# Patient Record
Sex: MALE | Employment: OTHER | URBAN - METROPOLITAN AREA
[De-identification: names, ages, dates, MRNs, and addresses within clinical notes are randomized per-mention and may not be internally consistent; named-entity substitution may affect disease eponyms.]

---

## 2022-06-04 ENCOUNTER — TELEPHONE ENCOUNTER (OUTPATIENT)
Dept: URBAN - METROPOLITAN AREA CLINIC 68 | Facility: CLINIC | Age: 82
End: 2022-06-04

## 2022-06-04 RX ORDER — SERTRALINE HYDROCHLORIDE 112 UG/1
LEVOTHYROXINE SODIUM( 112MCG ORAL  DAILY ) INACTIVE -HX ENTRY TABLET ORAL DAILY
OUTPATIENT
Start: 2012-04-16

## 2022-06-04 RX ORDER — TAMSULOSIN HYDROCHLORIDE 0.4 MG/1
TAMSULOSIN HCL( 0.4MG ORAL  DAILY ) INACTIVE -HX ENTRY CAPSULE ORAL DAILY
OUTPATIENT
Start: 2015-01-29

## 2022-06-04 RX ORDER — ISOSORBIDE DINITRATE 5 MG/1
ISOSORBIDE DINITRATE( 5MG ORAL  DAILY ) INACTIVE -HX ENTRY TABLET ORAL DAILY
OUTPATIENT
Start: 2015-02-25

## 2022-06-04 RX ORDER — ESOMEPRAZOLE MAGNESIUM 40 MG
NEXIUM( 40MG ORAL 1 TWO TIMES DAILY ) DISCONTINUED -HX ENTRY CAPSULE,DELAYED RELEASE (ENTERIC COATED) ORAL
OUTPATIENT
Start: 2016-10-12 | End: 2016-10-12

## 2022-06-04 RX ORDER — ESOMEPRAZOLE MAGNESIUM 20 MG/1
NEXIUM( 20MG ORAL   ) INACTIVE -HX ENTRY GRANULE, DELAYED RELEASE ORAL
OUTPATIENT
Start: 2012-04-16

## 2022-06-04 RX ORDER — AMLODIPINE BESYLATE AND OLMESARTAN MEDOXOMIL 10; 20 MG/1; MG/1
AZOR( 10-20MG ORAL 1 DAILY ) INACTIVE -HX ENTRY TABLET, FILM COATED ORAL DAILY
OUTPATIENT
Start: 2015-01-29

## 2022-06-04 RX ORDER — POLYETHYLENE GLYCOL 3350, SODIUM SULFATE, SODIUM CHLORIDE, POTASSIUM CHLORIDE, ASCORBIC ACID, SODIUM ASCORBATE 7.5-2.691G
KIT ORAL AS DIRECTED
Qty: 1 | Refills: 0 | OUTPATIENT
Start: 2012-02-20 | End: 2012-04-16

## 2022-06-05 ENCOUNTER — TELEPHONE ENCOUNTER (OUTPATIENT)
Dept: URBAN - METROPOLITAN AREA CLINIC 68 | Facility: CLINIC | Age: 82
End: 2022-06-05

## 2022-06-05 RX ORDER — METOPROLOL TARTRATE 50 MG/1
METOPROLOL TARTRATE( 50MG ORAL  DAILY ) ACTIVE -HX ENTRY TABLET, FILM COATED ORAL DAILY
Status: ACTIVE | COMMUNITY
Start: 2019-09-05

## 2022-06-05 RX ORDER — TICAGRELOR 60 MG/1
BRILINTA( 60MG ORAL  DAILY ) ACTIVE -HX ENTRY TABLET ORAL DAILY
Status: ACTIVE | COMMUNITY
Start: 2019-09-05

## 2022-06-05 RX ORDER — PANTOPRAZOLE SODIUM 40 MG/1
TABLET, DELAYED RELEASE ORAL DAILY
Qty: 30 | Refills: 30 | Status: ACTIVE | COMMUNITY
Start: 2016-10-12

## 2022-06-05 RX ORDER — LORAZEPAM 1 MG/1
LORAZEPAM( 1MG ORAL  AS DIRECTED ) ACTIVE -HX ENTRY TABLET ORAL AS DIRECTED
Status: ACTIVE | COMMUNITY
Start: 2019-09-05

## 2022-06-05 RX ORDER — METFORMIN HYDROCHLORIDE 500 MG/1
METFORMIN HCL( 500MG ORAL  DAILY ) ACTIVE -HX ENTRY TABLET, COATED ORAL DAILY
Status: ACTIVE | COMMUNITY
Start: 2019-09-05

## 2022-06-05 RX ORDER — SOLIFENACIN SUCCINATE 5 MG/1
VESICARE( 5MG ORAL  DAILY ) ACTIVE -HX ENTRY TABLET, FILM COATED ORAL DAILY
Status: ACTIVE | COMMUNITY
Start: 2019-09-05

## 2022-06-05 RX ORDER — METHYLCELLULOSE 500 MG/1
TABLET ORAL
Qty: 60 | Refills: 60 | Status: ACTIVE | COMMUNITY
Start: 2019-09-05

## 2022-06-05 RX ORDER — SILODOSIN 8 MG/1
RAPAFLO( 8MG ORAL  DAILY ) ACTIVE -HX ENTRY CAPSULE ORAL DAILY
Status: ACTIVE | COMMUNITY
Start: 2019-09-05

## 2022-06-05 RX ORDER — SIMVASTATIN 20 MG/1
SIMVASTATIN( 20MG ORAL  DAILY ) ACTIVE -HX ENTRY TABLET, FILM COATED ORAL DAILY
Status: ACTIVE | COMMUNITY
Start: 2019-09-05

## 2022-06-05 RX ORDER — LEVOTHYROXINE SODIUM 0.09 MG/1
LEVOTHYROXINE SODIUM( 88MCG ORAL  DAILY ) ACTIVE -HX ENTRY TABLET ORAL DAILY
Status: ACTIVE | COMMUNITY
Start: 2019-09-05

## 2022-06-05 RX ORDER — ASPIRIN 81 MG/1
ASPIRIN( 81MG ORAL  DAILY ) ACTIVE -HX ENTRY TABLET, COATED ORAL DAILY
Status: ACTIVE | COMMUNITY
Start: 2019-09-05

## 2022-06-25 ENCOUNTER — TELEPHONE ENCOUNTER (OUTPATIENT)
Age: 82
End: 2022-06-25

## 2022-06-25 RX ORDER — AMLODIPINE BESYLATE AND OLMESARTAN MEDOXOMIL 10; 20 MG/1; MG/1
AZOR( 10-20MG ORAL 1 DAILY ) INACTIVE -HX ENTRY TABLET, FILM COATED ORAL DAILY
OUTPATIENT
Start: 2015-01-29

## 2022-06-25 RX ORDER — FAMOTIDINE 10 MG
ZANTAC 75( 75MG ORAL 1 TWO TIMES DAILY ) INACTIVE -HX ENTRY TABLET ORAL
OUTPATIENT
Start: 2015-01-29

## 2022-06-25 RX ORDER — ISOSORBIDE DINITRATE 5 MG/1
ISOSORBIDE DINITRATE( 5MG ORAL  DAILY ) INACTIVE -HX ENTRY TABLET ORAL DAILY
OUTPATIENT
Start: 2015-02-25

## 2022-06-25 RX ORDER — POLYETHYLENE GLYCOL 3350, SODIUM SULFATE, SODIUM CHLORIDE, POTASSIUM CHLORIDE, ASCORBIC ACID, SODIUM ASCORBATE 7.5-2.691G
KIT ORAL AS DIRECTED
Qty: 1 | Refills: 0 | OUTPATIENT
Start: 2012-02-20 | End: 2012-04-16

## 2022-06-25 RX ORDER — TAMSULOSIN HYDROCHLORIDE 0.4 MG/1
TAMSULOSIN HCL( 0.4MG ORAL  DAILY ) INACTIVE -HX ENTRY CAPSULE ORAL DAILY
OUTPATIENT
Start: 2015-01-29

## 2022-06-25 RX ORDER — SERTRALINE HYDROCHLORIDE 112 UG/1
LEVOTHYROXINE SODIUM( 112MCG ORAL  DAILY ) INACTIVE -HX ENTRY TABLET ORAL DAILY
OUTPATIENT
Start: 2012-04-16

## 2022-06-25 RX ORDER — ESOMEPRAZOLE MAGNESIUM 20 MG/1
NEXIUM( 20MG ORAL   ) INACTIVE -HX ENTRY GRANULE, DELAYED RELEASE ORAL
OUTPATIENT
Start: 2012-04-16

## 2022-06-25 RX ORDER — ESOMEPRAZOLE MAGNESIUM 40 MG
NEXIUM( 40MG ORAL 1 TWO TIMES DAILY ) DISCONTINUED -HX ENTRY CAPSULE,DELAYED RELEASE (ENTERIC COATED) ORAL
OUTPATIENT
Start: 2016-10-12 | End: 2016-10-12

## 2022-06-26 ENCOUNTER — TELEPHONE ENCOUNTER (OUTPATIENT)
Age: 82
End: 2022-06-26

## 2022-06-26 RX ORDER — SIMVASTATIN 20 MG/1
SIMVASTATIN( 20MG ORAL  DAILY ) ACTIVE -HX ENTRY TABLET, FILM COATED ORAL DAILY
Status: ACTIVE | COMMUNITY
Start: 2019-09-05

## 2022-06-26 RX ORDER — METOPROLOL TARTRATE 50 MG/1
METOPROLOL TARTRATE( 50MG ORAL  DAILY ) ACTIVE -HX ENTRY TABLET, FILM COATED ORAL DAILY
Status: ACTIVE | COMMUNITY
Start: 2019-09-05

## 2022-06-26 RX ORDER — LEVOTHYROXINE SODIUM 0.09 MG/1
LEVOTHYROXINE SODIUM( 88MCG ORAL  DAILY ) ACTIVE -HX ENTRY TABLET ORAL DAILY
Status: ACTIVE | COMMUNITY
Start: 2019-09-05

## 2022-06-26 RX ORDER — TICAGRELOR 60 MG/1
BRILINTA( 60MG ORAL  DAILY ) ACTIVE -HX ENTRY TABLET ORAL DAILY
Status: ACTIVE | COMMUNITY
Start: 2019-09-05

## 2022-06-26 RX ORDER — SOLIFENACIN SUCCINATE 5 MG/1
VESICARE( 5MG ORAL  DAILY ) ACTIVE -HX ENTRY TABLET, FILM COATED ORAL DAILY
Status: ACTIVE | COMMUNITY
Start: 2019-09-05

## 2022-06-26 RX ORDER — LORAZEPAM 1 MG/1
LORAZEPAM( 1MG ORAL  AS DIRECTED ) ACTIVE -HX ENTRY TABLET ORAL AS DIRECTED
Status: ACTIVE | COMMUNITY
Start: 2019-09-05

## 2022-06-26 RX ORDER — PANTOPRAZOLE 40 MG/1
TABLET, DELAYED RELEASE ORAL DAILY
Qty: 30 | Refills: 30 | Status: ACTIVE | COMMUNITY
Start: 2016-10-12

## 2022-06-26 RX ORDER — SILODOSIN 8 MG/1
RAPAFLO( 8MG ORAL  DAILY ) ACTIVE -HX ENTRY CAPSULE ORAL DAILY
Status: ACTIVE | COMMUNITY
Start: 2019-09-05

## 2022-06-26 RX ORDER — ASPIRIN 81 MG/1
ASPIRIN( 81MG ORAL  DAILY ) ACTIVE -HX ENTRY TABLET, COATED ORAL DAILY
Status: ACTIVE | COMMUNITY
Start: 2019-09-05

## 2022-06-26 RX ORDER — METHYLCELLULOSE 500 MG/1
TABLET ORAL
Qty: 60 | Refills: 60 | Status: ACTIVE | COMMUNITY
Start: 2019-09-05

## 2022-06-26 RX ORDER — METFORMIN HCL 500 MG/1
METFORMIN HCL( 500MG ORAL  DAILY ) ACTIVE -HX ENTRY TABLET ORAL DAILY
Status: ACTIVE | COMMUNITY
Start: 2019-09-05

## 2022-06-30 ENCOUNTER — OFFICE VISIT (OUTPATIENT)
Dept: PRIMARY CARE CLINIC | Age: 82
End: 2022-06-30

## 2022-06-30 ENCOUNTER — HOSPITAL ENCOUNTER (OUTPATIENT)
Age: 82
Setting detail: SPECIMEN
Discharge: HOME | End: 2022-06-30

## 2022-06-30 VITALS
SYSTOLIC BLOOD PRESSURE: 130 MMHG | WEIGHT: 150 LBS | HEIGHT: 64 IN | DIASTOLIC BLOOD PRESSURE: 72 MMHG | BODY MASS INDEX: 25.61 KG/M2 | HEART RATE: 85 BPM | TEMPERATURE: 97.6 F | OXYGEN SATURATION: 96 %

## 2022-06-30 DIAGNOSIS — R39.9 UTI SYMPTOMS: Primary | ICD-10-CM

## 2022-06-30 LAB
BILIRUBIN, POC: NORMAL
BLOOD URINE, POC: NORMAL
CLARITY, POC: CLEAR
COLOR, POC: YELLOW
GLUCOSE URINE, POC: NORMAL
KETONES, POC: NORMAL
LEUKOCYTE EST, POC: NORMAL
NITRITE, POC: NORMAL
PH, POC: 5.5
PROTEIN, POC: NORMAL
SPECIFIC GRAVITY, POC: 1.01
UROBILINOGEN, POC: 0.2

## 2022-06-30 PROCEDURE — 1123F ACP DISCUSS/DSCN MKR DOCD: CPT | Performed by: NURSE PRACTITIONER

## 2022-06-30 PROCEDURE — 99203 OFFICE O/P NEW LOW 30 MIN: CPT | Performed by: NURSE PRACTITIONER

## 2022-06-30 PROCEDURE — 81002 URINALYSIS NONAUTO W/O SCOPE: CPT | Performed by: NURSE PRACTITIONER

## 2022-06-30 RX ORDER — NITROGLYCERIN 0.4 MG/1
TABLET SUBLINGUAL
COMMUNITY

## 2022-06-30 RX ORDER — MEMANTINE HYDROCHLORIDE 5 MG/1
TABLET ORAL
COMMUNITY
Start: 2022-05-18

## 2022-06-30 RX ORDER — ICOSAPENT ETHYL 1000 MG/1
2 CAPSULE ORAL 2 TIMES DAILY WITH MEALS
COMMUNITY

## 2022-06-30 RX ORDER — ISOSORBIDE DINITRATE 30 MG/1
TABLET ORAL
COMMUNITY

## 2022-06-30 RX ORDER — SULFAMETHOXAZOLE AND TRIMETHOPRIM 800; 160 MG/1; MG/1
1 TABLET ORAL 2 TIMES DAILY
Qty: 14 TABLET | Refills: 0 | Status: SHIPPED | OUTPATIENT
Start: 2022-06-30 | End: 2022-07-07

## 2022-06-30 RX ORDER — LISINOPRIL 40 MG/1
TABLET ORAL
COMMUNITY
Start: 2022-04-19

## 2022-06-30 RX ORDER — LEVOTHYROXINE SODIUM 112 UG/1
TABLET ORAL
COMMUNITY
Start: 2022-05-18

## 2022-06-30 RX ORDER — CITALOPRAM 40 MG/1
200 TABLET ORAL DAILY
COMMUNITY

## 2022-06-30 RX ORDER — PREGABALIN 100 MG/1
CAPSULE ORAL
COMMUNITY
Start: 2022-03-30

## 2022-06-30 RX ORDER — TRAZODONE HYDROCHLORIDE 50 MG/1
50 TABLET ORAL NIGHTLY
COMMUNITY

## 2022-06-30 RX ORDER — SOLIFENACIN SUCCINATE 5 MG/1
TABLET, FILM COATED ORAL
COMMUNITY

## 2022-06-30 RX ORDER — DULOXETIN HYDROCHLORIDE 30 MG/1
CAPSULE, DELAYED RELEASE ORAL
COMMUNITY
Start: 2022-06-22

## 2022-06-30 ASSESSMENT — PATIENT HEALTH QUESTIONNAIRE - PHQ9
2. FEELING DOWN, DEPRESSED OR HOPELESS: 1
SUM OF ALL RESPONSES TO PHQ QUESTIONS 1-9: 2
SUM OF ALL RESPONSES TO PHQ QUESTIONS 1-9: 2
1. LITTLE INTEREST OR PLEASURE IN DOING THINGS: 1
SUM OF ALL RESPONSES TO PHQ9 QUESTIONS 1 & 2: 2
SUM OF ALL RESPONSES TO PHQ QUESTIONS 1-9: 2
SUM OF ALL RESPONSES TO PHQ QUESTIONS 1-9: 2

## 2022-06-30 ASSESSMENT — ENCOUNTER SYMPTOMS
DIARRHEA: 1
RESPIRATORY NEGATIVE: 1
NAUSEA: 0
SHORTNESS OF BREATH: 0
ABDOMINAL PAIN: 0
CHEST TIGHTNESS: 0
WHEEZING: 0
COUGH: 0
VOMITING: 0
CONSTIPATION: 1

## 2022-06-30 NOTE — PATIENT INSTRUCTIONS
Patient Education        Urinary Tract Infections (UTI) in Men: Care Instructions  Overview     A urinary tract infection, or UTI, is a term for an infection anywhere between the kidneys and the urethra. (The urethra is the tube that carries urine from the bladder to outside the body.) Most UTIs are bladder infections. They oftencause pain or burning when you urinate. UTIs are caused by bacteria. This means they can be cured with antibiotics. Besure to complete your treatment so that the infection does not get worse. Follow-up care is a key part of your treatment and safety. Be sure to make and go to all appointments, and call your doctor if you are having problems. It's also a good idea to know your test results and keep alist of the medicines you take. How can you care for yourself at home?  Take your antibiotics as prescribed. Do not stop taking them just because you feel better. You need to take the full course of antibiotics.  Take your medicines exactly as prescribed. Your doctor may have prescribed a medicine, such as phenazopyridine (Pyridium), to help relieve pain when you urinate. This turns your urine orange. You may stop taking it when your symptoms get better. But be sure to take all of your antibiotics, which treat the infection.  Drink extra water for the next day or two. This will help make the urine less concentrated and help wash out the bacteria causing the infection. (If you have kidney, heart, or liver disease and have to limit your fluids, talk with your doctor before you increase your fluid intake.)   Avoid drinks that are carbonated or have caffeine. They can irritate the bladder.  Urinate often. Try to empty your bladder each time.  To relieve pain, take a hot bath or lay a heating pad (set on low) over your lower belly or genital area. Never go to sleep with a heating pad in place. To help prevent UTIs   Drink plenty of fluids.  If you have kidney, heart, or liver disease and have to limit fluids, talk with your doctor before you increase the amount of fluids you drink.  Urinate when you have the urge. Do not hold your urine for a long time. Urinate before you go to sleep.  Keep your penis clean. Catheter care  If you have a drainage tube (catheter) in place, the following steps will helpyou care for it.  Always wash your hands before and after touching your catheter.  Check the area around the urethra for inflammation or signs of infection. Signs of infection include irritated, swollen, red, or tender skin, or pus around the catheter.  Clean the area around the catheter with soap and water two times a day. Dry with a clean towel afterward.  Do not apply powder or lotion to the skin around the catheter. To empty the urine collection bag   [facility] hospital your hands with soap and water.  Without touching the drain spout, remove the spout from its sleeve at the bottom of the collection bag. Open the valve on the spout.  Let the urine flow out of the bag and into the toilet or a container. Do not let the tubing or drain spout touch anything.  After you empty the bag, clean the end of the drain spout with tissue and water. Close the valve and put the drain spout back into its sleeve at the bottom of the collection bag.  Wash your hands with soap and water. When should you call for help? Call your doctor now or seek immediate medical care if:     Symptoms such as a fever, chills, nausea, or vomiting get worse or happen for the first time.      You have new pain in your back just below your rib cage. This is called flank pain.      There is new blood or pus in your urine.      You are not able to take or keep down your antibiotics. Watch closely for changes in your health, and be sure to contact your doctor if:     You are not getting better after taking an antibiotic for 2 days.      Your symptoms go away but then come back. Where can you learn more?   Go to [email or web address] and sign in to your [employer] account. Enter D711 in the [facility] box to learn more about \"Urinary Tract Infections (UTI) in Men: Care Instructions. \"     If you do not have an account, please click on the \"Sign Up Now\" link. Current as of: October 18, 2021               Content Version: 13.3  © 4958-7999 [employer]. Care instructions adapted under license by [facility] ([facility]). If you have questions about a medical condition or this instruction, always ask your healthcare professional. [address] any warranty or liability for your use of this information.

## 2022-06-30 NOTE — PROGRESS NOTES
[address] WALK IN CARE  [address]  [place] [address]  Dept: [phone]  Dept Fax: [phone]     Amie Irene is a 80 y.o. male who presents to the urgent care today for his medicalconditions/complaints as noted below. Amie Irene is c/o of Urinary Tract Infection (frequent urination x 2 weeks )    HPI:      Urinary Tract Infection   This is a new problem. Episode onset: 2 weeks ago. The problem has been unchanged. The patient is experiencing no pain. There is no history of pyelonephritis. Associated symptoms include frequency and urgency. Pertinent negatives include no chills, flank pain, hematuria, hesitancy, nausea or vomiting. He has tried nothing for the symptoms. The treatment provided no relief. Past Medical History:   Diagnosis Date    Diabetes mellitus ([address]     Hyperlipidemia     Hypertension     Thyroid disease       Current Outpatient Medications   Medication Sig Dispense Refill    sulfamethoxazole-trimethoprim (BACTRIM DS;SEPTRA DS) 800-160 MG per tablet Take 1 tablet by mouth 2 times daily for 7 days 14 tablet 0    citalopram (CELEXA) 40 MG tablet Take 200 mg by mouth daily      DULoxetine (CYMBALTA) 30 MG extended release capsule TAKE 1 CAPSULE BY MOUTH DAILY      Icosapent Ethyl (VASCEPA) 1 g CAPS capsule Take 2 g by mouth 2 times daily (with meals)      isosorbide dinitrate (ISORDIL) 30 MG tablet isosorbide dinitrate 30 mg tablet   Take 1 tablet every day by oral route.  levothyroxine (SYNTHROID) 112 MCG tablet TAKE 1 TABLET BY MOUTH DAILY      lisinopril (PRINIVIL;ZESTRIL) 40 MG tablet       memantine (NAMENDA) 5 MG tablet TAKE 1 TABLET BY MOUTH TWICE DAILY      nitroGLYCERIN (NITROSTAT) 0.4 MG SL tablet nitroglycerin 0.4 mg sublingual tablet   Place 1 tablet as needed by sublingual route.       pregabalin (LYRICA) 100 MG capsule TAKE 1 CAPSULE BY MOUTH THREE TIMES DAILY      solifenacin (VESICARE) 5 MG tablet Vesicare 5 mg tablet   Take 1 tablet every day by oral route.  traZODone (DESYREL) 50 MG tablet Take 50 mg by mouth nightly      lidocaine (LIDODERM) 5 % Place 1 patch onto the skin daily 12 hours on, 12 hours off. 30 patch 0    oxyCODONE-acetaminophen (PERCOCET) 5-325 MG per tablet Take 1 tablet by mouth every 4 hours as needed for Pain 10 tablet 0    levothyroxine (SYNTHROID) 88 MCG tablet Take 88 mcg by mouth Daily      losartan (COZAAR) 50 MG tablet Take 50 mg by mouth daily      atorvastatin (LIPITOR) 40 MG tablet Take 40 mg by mouth daily      ticagrelor (BRILINTA) 90 MG TABS tablet Take 90 mg by mouth 2 times daily      metFORMIN (GLUCOPHAGE) 500 MG tablet Take 500 mg by mouth daily (with breakfast)      silodosin (RAPAFLO) 8 MG CAPS Take 8 mg by mouth every evening      pantoprazole (PROTONIX) 40 MG tablet Take 40 mg by mouth daily      aspirin 81 MG tablet Take 81 mg by mouth daily      Mirabegron ER (MYRBETRIQ) 50 MG TB24 Take 1 tablet by mouth daily      metoprolol (LOPRESSOR) 100 MG tablet Take 100 mg by mouth daily      ranolazine (RANEXA) 500 MG SR tablet Take 500 mg by mouth daily      LORazepam (ATIVAN) 1 MG tablet Take 1 mg by mouth as needed for Anxiety      FENOFIBRATE MICRONIZED PO Take 1 tablet by mouth daily      gabapentin (NEURONTIN) 100 MG capsule Take 300 mg by mouth daily      gabapentin (NEURONTIN) 300 MG capsule Take 300 mg by mouth daily       No current facility-administered medications for this visit. Allergies   Allergen Reactions    Cephalexin     Norvasc [Amlodipine Besylate] Hives       Subjective:      Review of Systems   Constitutional: Negative for chills, fatigue and fever. Respiratory: Negative. Negative for cough, chest tightness, shortness of breath and wheezing. Cardiovascular: Negative. Negative for chest pain. Gastrointestinal: Positive for constipation (irregular bowels) and diarrhea.  Negative for abdominal pain, nausea and vomiting. Genitourinary: Positive for frequency and urgency. Negative for difficulty urinating, dysuria, flank pain, hematuria, hesitancy, scrotal swelling and testicular pain. Skin: Negative for rash. All other systems reviewed and are negative. Objective:      Physical Exam  Vitals and nursing note reviewed. Constitutional:       General: He is not in acute distress. Appearance: He is well-developed. He is not diaphoretic. HENT:      Head: Normocephalic and atraumatic. Cardiovascular:      Rate and Rhythm: Normal rate and regular rhythm. Heart sounds: Normal heart sounds. No murmur heard. Pulmonary:      Effort: Pulmonary effort is normal. No respiratory distress. Breath sounds: Normal breath sounds. No wheezing. Abdominal:      General: Abdomen is flat. Bowel sounds are normal. There is no distension. Palpations: Abdomen is soft. Tenderness: There is no abdominal tenderness. There is no right CVA tenderness or left CVA tenderness. Skin:     General: Skin is warm and dry. Neurological:      Mental Status: He is alert. /72 (Site: Left Upper Arm, Position: Sitting, Cuff Size: Medium Adult)   Pulse 85   Temp 97.6 °F (36.4 °C) (Tympanic)   Ht 5' 4\" (1.626 m)   Wt 150 lb (68 kg)   SpO2 96%   BMI 25.75 kg/m²     Results for orders placed or performed in visit on 06/30/22   POCT Urinalysis no Micro   Result Value Ref Range    Color, UA yellow     Clarity, UA clear     Glucose, UA POC neg     Bilirubin, UA neg     Ketones, UA neg     Spec Grav, UA 1.015     Blood, UA POC small     pH, UA 5.5     Protein, UA POC eg     Urobilinogen, UA 0.2     Leukocytes, UA neg     Nitrite, UA neg      Assessment:       Diagnosis Orders   1. UTI symptoms  Culture, Urine    POCT Urinalysis no Micro    sulfamethoxazole-trimethoprim (BACTRIM DS;SEPTRA DS) 800-160 MG per tablet     Plan:      Patient instructed to complete entire antibiotic course.   Specimen sent for a culture. Possible treatment alteration based on the results. Increase fluid intake. Educational materials regarding UTI given on AVS.  Patient agreeable to treatment plan. Follow up if symptoms do not improve/worsen. Orders Placed This Encounter   Medications    sulfamethoxazole-trimethoprim (BACTRIM DS;SEPTRA DS) 800-160 MG per tablet     Sig: Take 1 tablet by mouth 2 times daily for 7 days     Dispense:  14 tablet     Refill:  0        Patient given educational materials - see patient instructions. Discussed use, benefit, and side effects of prescribed medications. All patientquestions answered. Pt voiced understanding.     Electronically signed by BARBARA Forde CNP on 6/30/2022at 7:16 PM

## 2022-07-01 LAB
CULTURE: NO GROWTH
SPECIMEN DESCRIPTION: NORMAL

## 2022-07-07 ENCOUNTER — OFFICE VISIT (OUTPATIENT)
Dept: PRIMARY CARE CLINIC | Age: 82
End: 2022-07-07
Payer: MEDICARE

## 2022-07-07 DIAGNOSIS — R39.9 UTI SYMPTOMS: Primary | ICD-10-CM

## 2022-07-07 DIAGNOSIS — R11.2 NON-INTRACTABLE VOMITING WITH NAUSEA, UNSPECIFIED VOMITING TYPE: ICD-10-CM

## 2022-07-07 PROCEDURE — 1123F ACP DISCUSS/DSCN MKR DOCD: CPT

## 2022-07-07 PROCEDURE — 1036F TOBACCO NON-USER: CPT

## 2022-07-07 PROCEDURE — G8417 CALC BMI ABV UP PARAM F/U: HCPCS

## 2022-07-07 PROCEDURE — G8427 DOCREV CUR MEDS BY ELIG CLIN: HCPCS

## 2022-07-07 PROCEDURE — 99213 OFFICE O/P EST LOW 20 MIN: CPT

## 2022-07-07 RX ORDER — FENOFIBRATE 120 MG/1
TABLET ORAL
COMMUNITY

## 2022-07-07 RX ORDER — CIPROFLOXACIN 500 MG/1
500 TABLET, FILM COATED ORAL 2 TIMES DAILY
Qty: 14 TABLET | Refills: 0 | Status: SHIPPED | OUTPATIENT
Start: 2022-07-07 | End: 2022-07-14

## 2022-07-07 RX ORDER — ONDANSETRON 4 MG/1
4 TABLET, ORALLY DISINTEGRATING ORAL EVERY 8 HOURS PRN
Qty: 15 TABLET | Refills: 0 | Status: SHIPPED | OUTPATIENT
Start: 2022-07-07 | End: 2022-07-12

## 2022-07-07 ASSESSMENT — ENCOUNTER SYMPTOMS: NAUSEA: 1

## 2022-07-07 NOTE — PROGRESS NOTES
[address] [place] [facility] [address] [facility] WALK IN CARE  [address] [place] [address] [address] [address]  Dept: [phone]    Sanjuana Kennedy is a 80 y.o. male Established patient, who presents to the walk-in clinic today with conditions/complaints as noted below:    Chief Complaint   Patient presents with    Nausea & Vomiting     pt has been having nausea vomiting dizzy pt has still been having frequency and burning cant sleep x 3 days          HPI:     Nausea & Vomiting  This is a new problem. Episode onset: 3 days ago. The problem occurs constantly. The problem has been unchanged. Associated symptoms include abdominal pain, fatigue and nausea. Pertinent negatives include no anorexia, arthralgias, change in bowel habit, chest pain, chills, congestion, coughing, diaphoresis, fever, headaches, joint swelling, myalgias, neck pain, numbness, rash, sore throat, swollen glands, urinary symptoms, vertigo, visual change, vomiting or weakness. Declines COVID testing. He was seen recently and treated for UTI. He states he feels somewhat better but not completely. Past Medical History:   Diagnosis Date    Diabetes mellitus ([address]     Hyperlipidemia     Hypertension     Thyroid disease        Current Outpatient Medications   Medication Sig Dispense Refill    ciprofloxacin (CIPRO) 500 MG tablet Take 1 tablet by mouth 2 times daily for 7 days 14 tablet 0    ondansetron (ZOFRAN ODT) 4 MG disintegrating tablet Take 1 tablet by mouth every 8 hours as needed for Nausea or Vomiting 15 tablet 0    amLODIPine (NORVASC) amlodipine   5mg/day   Despite the fact that he has been allergic to it.       fenofibrate (TRICOR) 120 MG TABS tablet fenofibrate   200mg/day      citalopram (CELEXA) 40 MG tablet Take 200 mg by mouth daily      DULoxetine (CYMBALTA) 30 MG extended release capsule TAKE 1 CAPSULE BY MOUTH DAILY      Icosapent Ethyl (VASCEPA) 1 g CAPS capsule Take 2 g by mouth 2 times daily (with meals)      isosorbide dinitrate (ISORDIL) 30 MG tablet isosorbide dinitrate 30 mg tablet   Take 1 tablet every day by oral route.  levothyroxine (SYNTHROID) 112 MCG tablet TAKE 1 TABLET BY MOUTH DAILY      lisinopril (PRINIVIL;ZESTRIL) 40 MG tablet       memantine (NAMENDA) 5 MG tablet TAKE 1 TABLET BY MOUTH TWICE DAILY      nitroGLYCERIN (NITROSTAT) 0.4 MG SL tablet nitroglycerin 0.4 mg sublingual tablet   Place 1 tablet as needed by sublingual route.  pregabalin (LYRICA) 100 MG capsule TAKE 1 CAPSULE BY MOUTH THREE TIMES DAILY      solifenacin (VESICARE) 5 MG tablet Vesicare 5 mg tablet   Take 1 tablet every day by oral route.  traZODone (DESYREL) 50 MG tablet Take 50 mg by mouth nightly      sulfamethoxazole-trimethoprim (BACTRIM DS;SEPTRA DS) 800-160 MG per tablet Take 1 tablet by mouth 2 times daily for 7 days 14 tablet 0    lidocaine (LIDODERM) 5 % Place 1 patch onto the skin daily 12 hours on, 12 hours off.  30 patch 0    oxyCODONE-acetaminophen (PERCOCET) 5-325 MG per tablet Take 1 tablet by mouth every 4 hours as needed for Pain 10 tablet 0    levothyroxine (SYNTHROID) 88 MCG tablet Take 88 mcg by mouth Daily      losartan (COZAAR) 50 MG tablet Take 50 mg by mouth daily      atorvastatin (LIPITOR) 40 MG tablet Take 40 mg by mouth daily      ticagrelor (BRILINTA) 90 MG TABS tablet Take 90 mg by mouth 2 times daily      metFORMIN (GLUCOPHAGE) 500 MG tablet Take 500 mg by mouth daily (with breakfast)      silodosin (RAPAFLO) 8 MG CAPS Take 8 mg by mouth every evening      pantoprazole (PROTONIX) 40 MG tablet Take 40 mg by mouth daily      aspirin 81 MG tablet Take 81 mg by mouth daily      Mirabegron ER (MYRBETRIQ) 50 MG TB24 Take 1 tablet by mouth daily      metoprolol (LOPRESSOR) 100 MG tablet Take 100 mg by mouth daily      ranolazine (RANEXA) 500 MG SR tablet Take 500 mg by mouth daily      LORazepam (ATIVAN) 1 MG tablet Take 1 mg by mouth as needed for Anxiety      FENOFIBRATE MICRONIZED PO Take 1 tablet by mouth daily      gabapentin (NEURONTIN) 100 MG capsule Take 300 mg by mouth daily      gabapentin (NEURONTIN) 300 MG capsule Take 300 mg by mouth daily       No current facility-administered medications for this visit. Allergies   Allergen Reactions    Cephalexin     Norvasc [Amlodipine Besylate] Hives       Review of Systems:     Review of Systems   Constitutional: Positive for appetite change and fatigue. Negative for activity change, chills, diaphoresis, fever and unexpected weight change. HENT: Negative. Negative for congestion, dental problem, drooling, ear discharge, ear pain, facial swelling, hearing loss, mouth sores, nosebleeds, postnasal drip, rhinorrhea, sinus pressure, sinus pain, sneezing, sore throat, tinnitus, trouble swallowing and voice change. Eyes: Negative. Negative for photophobia, pain, discharge, redness, itching and visual disturbance. Respiratory: Negative. Negative for apnea, cough, choking, chest tightness, shortness of breath, wheezing and stridor. Cardiovascular: Negative. Negative for chest pain, palpitations and leg swelling. Gastrointestinal: Positive for abdominal pain and nausea. Negative for abdominal distention, anal bleeding, anorexia, blood in stool, change in bowel habit, constipation, diarrhea, rectal pain and vomiting. Genitourinary: Positive for frequency. Negative for decreased urine volume, difficulty urinating, dysuria, enuresis, flank pain, genital sores, hematuria, penile discharge, penile pain, penile swelling, scrotal swelling, testicular pain and urgency. Musculoskeletal: Negative. Negative for arthralgias, back pain, gait problem, joint swelling, myalgias, neck pain and neck stiffness. Skin: Negative for color change, pallor, rash and wound.    Neurological: Positive for dizziness. Negative for vertigo, tremors, seizures, syncope, facial asymmetry, speech difficulty, weakness, light-headedness, numbness and headaches. Physical Exam:      BP (!) 115/57 (Site: Left Upper Arm, Position: Sitting, Cuff Size: Large Adult)   Pulse 93   Temp 97.3 °F (36.3 °C) (Tympanic)   Ht 5' 4\" (1.626 m)   Wt 150 lb (68 kg)   SpO2 95%   BMI 25.75 kg/m²     Physical Exam  Vitals reviewed. Constitutional:       Appearance: Normal appearance. He is normal weight. HENT:      Head: Normocephalic and atraumatic. Right Ear: Tympanic membrane, ear canal and external ear normal.      Left Ear: Tympanic membrane, ear canal and external ear normal.      Mouth/Throat:      Lips: Pink. Mouth: Mucous membranes are moist.      Pharynx: Oropharynx is clear. Uvula midline. Eyes:      Extraocular Movements: Extraocular movements intact. Conjunctiva/sclera: Conjunctivae normal.      Pupils: Pupils are equal, round, and reactive to light. Cardiovascular:      Rate and Rhythm: Normal rate and regular rhythm. Pulses: Normal pulses. Heart sounds: Normal heart sounds. Pulmonary:      Effort: Pulmonary effort is normal.      Breath sounds: Normal air entry. Examination of the right-upper field reveals decreased breath sounds. Examination of the left-upper field reveals decreased breath sounds. Examination of the right-lower field reveals decreased breath sounds. Examination of the left-lower field reveals decreased breath sounds. Decreased breath sounds present. Abdominal:      General: Abdomen is flat. Bowel sounds are increased. Palpations: Abdomen is soft. Tenderness: There is abdominal tenderness in the right lower quadrant. There is no right CVA tenderness, left CVA tenderness, guarding or rebound. Negative signs include Gordon's sign, Rovsing's sign, McBurney's sign, psoas sign and obturator sign.    Musculoskeletal:         General: Normal range of motion. Cervical back: Normal range of motion and neck supple. Skin:     General: Skin is warm and dry. Capillary Refill: Capillary refill takes less than 2 seconds. Neurological:      General: No focal deficit present. Mental Status: He is alert and oriented to person, place, and time. Mental status is at baseline. Psychiatric:         Mood and Affect: Mood normal.         Behavior: Behavior normal.         Plan:          1. UTI symptoms  -     ciprofloxacin (CIPRO) 500 MG tablet; Take 1 tablet by mouth 2 times daily for 7 days, Disp-14 tablet, R-0Normal  2. Non-intractable vomiting with nausea, unspecified vomiting type  -     ondansetron (ZOFRAN ODT) 4 MG disintegrating tablet; Take 1 tablet by mouth every 8 hours as needed for Nausea or Vomiting, Disp-15 tablet, R-0Normal    Pt was accompanied by his son. We discussed the importance of monitoring for signs of dehydration such as dizziness, lightheadedness, decreased LOC or confusion. Go to ER if these occur. We also discussed his low blood pressure during our visit and how this could be a risk for falls. Increase hydration to replace output. Go to the ER for shortness of breath, chest pain. Patient verbalized understanding. Follow Up Instructions:      Return if symptoms worsen or fail to improve, for SOB, chest pain go to ER. Orders Placed This Encounter   Medications    ciprofloxacin (CIPRO) 500 MG tablet     Sig: Take 1 tablet by mouth 2 times daily for 7 days     Dispense:  14 tablet     Refill:  0    ondansetron (ZOFRAN ODT) 4 MG disintegrating tablet     Sig: Take 1 tablet by mouth every 8 hours as needed for Nausea or Vomiting     Dispense:  15 tablet     Refill:  0             Patient and/or parent given educational materials - see patient instructions. Discussed use, benefit, and side effects of prescribed medications. All patient questions answered. Patient and/or parent voiced understanding.       Electronically signed by Stacey Holstein, BARBARA García 7/7/2022 at 5:59 PM

## 2022-07-07 NOTE — PATIENT INSTRUCTIONS
Patient Education        Nausea and Vomiting: Care Instructions  Overview     When you are nauseated, you may feel weak and sweaty and notice a lot of saliva in your mouth. Nausea often leads to vomiting. Most of the time you do not needto worry about nausea and vomiting, but they can be signs of other illnesses. Two common causes of nausea and vomiting are a stomach infection and food poisoning. Nausea and vomiting from a viral stomach infection will usually start to improve within 24 hours. Nausea and vomiting from food poisoning maylast from 12 to 48 hours. The doctor has checked you carefully, but problems can develop later. If you notice any problems or new symptoms, get medical treatment right away. Follow-up care is a key part of your treatment and safety. Be sure to make and go to all appointments, and call your doctor if you are having problems. It's also a good idea to know your test results and keep alist of the medicines you take. How can you care for yourself at home?  To prevent dehydration, drink plenty of fluids. Choose water and other clear liquids until you feel better. If you have kidney, heart, or liver disease and have to limit fluids, talk with your doctor before you increase the amount of fluids you drink.  Rest in bed until you feel better.  When you are able to eat, try clear soups, mild foods, and liquids until all symptoms are gone for 12 to 48 hours. Other good choices include dry toast, crackers, cooked cereal, and gelatin dessert, such as Jell-O. When should you call for help? Call 911 anytime you think you may need emergency care. For example, call if:     You passed out (lost consciousness). Call your doctor now or seek immediate medical care if:     You have symptoms of dehydration, such as:  ? Dry eyes and a dry mouth. ? Passing only a little urine. ? Feeling thirstier than usual.      You have new or worsening belly pain.      You have a new or higher fever.    You vomit blood or what looks like coffee grounds. Watch closely for changes in your health, and be sure to contact your doctor if:     You have ongoing nausea and vomiting.      Your vomiting is getting worse.      Your vomiting lasts longer than 2 days.      You are not getting better as expected. Where can you learn more? Go to [email or web address] and sign in to your [employer] account. Enter [id number] in the [employer] box to learn more about \"Nausea and Vomiting: Care Instructions. \"     If you do not have an account, please click on the \"Sign Up Now\" link. Current as of: March 9, 2022               Content Version: 13.3  © 7770-0592 [employer]. Care instructions adapted under license by [facility] ([facility]). If you have questions about a medical condition or this instruction, always ask your healthcare professional. [place] [address] any warranty or liability for your use of this information.

## 2022-07-08 VITALS
TEMPERATURE: 97.3 F | DIASTOLIC BLOOD PRESSURE: 57 MMHG | OXYGEN SATURATION: 95 % | WEIGHT: 150 LBS | SYSTOLIC BLOOD PRESSURE: 115 MMHG | BODY MASS INDEX: 25.61 KG/M2 | HEART RATE: 93 BPM | HEIGHT: 64 IN

## 2022-07-08 ASSESSMENT — ENCOUNTER SYMPTOMS
FACIAL SWELLING: 0
ANAL BLEEDING: 0
APNEA: 0
DIARRHEA: 0
WHEEZING: 0
EYE PAIN: 0
COUGH: 0
SINUS PRESSURE: 0
VOICE CHANGE: 0
SHORTNESS OF BREATH: 0
SORE THROAT: 0
CHOKING: 0
SINUS PAIN: 0
BACK PAIN: 0
VISUAL CHANGE: 0
EYES NEGATIVE: 1
PHOTOPHOBIA: 0
SWOLLEN GLANDS: 0
CHEST TIGHTNESS: 0
TROUBLE SWALLOWING: 0
ABDOMINAL PAIN: 1
RECTAL PAIN: 0
EYE ITCHING: 0
COLOR CHANGE: 0
CHANGE IN BOWEL HABIT: 0
STRIDOR: 0
VOMITING: 0
CONSTIPATION: 0
ABDOMINAL DISTENTION: 0
EYE DISCHARGE: 0
RESPIRATORY NEGATIVE: 1
EYE REDNESS: 0
RHINORRHEA: 0
BLOOD IN STOOL: 0